# Patient Record
Sex: MALE | Race: WHITE | NOT HISPANIC OR LATINO | Employment: PART TIME | URBAN - METROPOLITAN AREA
[De-identification: names, ages, dates, MRNs, and addresses within clinical notes are randomized per-mention and may not be internally consistent; named-entity substitution may affect disease eponyms.]

---

## 2018-12-02 ENCOUNTER — OFFICE VISIT (OUTPATIENT)
Dept: URGENT CARE | Facility: CLINIC | Age: 18
End: 2018-12-02
Payer: COMMERCIAL

## 2018-12-02 VITALS
RESPIRATION RATE: 16 BRPM | WEIGHT: 191 LBS | HEART RATE: 60 BPM | HEIGHT: 71 IN | TEMPERATURE: 99.3 F | BODY MASS INDEX: 26.74 KG/M2 | OXYGEN SATURATION: 99 %

## 2018-12-02 DIAGNOSIS — S61.219A LACERATION WITHOUT FOREIGN BODY OF UNSPECIFIED FINGER WITHOUT DAMAGE TO NAIL, INITIAL ENCOUNTER: Primary | ICD-10-CM

## 2018-12-02 PROCEDURE — 12002 RPR S/N/AX/GEN/TRNK2.6-7.5CM: CPT | Performed by: PHYSICIAN ASSISTANT

## 2018-12-02 PROCEDURE — 90715 TDAP VACCINE 7 YRS/> IM: CPT

## 2018-12-02 PROCEDURE — G0382 LEV 3 HOSP TYPE B ED VISIT: HCPCS | Performed by: PHYSICIAN ASSISTANT

## 2018-12-02 RX ORDER — LIDOCAINE HYDROCHLORIDE 10 MG/ML
5 INJECTION, SOLUTION EPIDURAL; INFILTRATION; INTRACAUDAL; PERINEURAL ONCE
Status: COMPLETED | OUTPATIENT
Start: 2018-12-02 | End: 2018-12-02

## 2018-12-02 RX ORDER — GINSENG 100 MG
1 CAPSULE ORAL ONCE
Status: COMPLETED | OUTPATIENT
Start: 2018-12-02 | End: 2018-12-02

## 2018-12-02 RX ADMIN — Medication 1 SMALL APPLICATION: at 19:57

## 2018-12-02 RX ADMIN — LIDOCAINE HYDROCHLORIDE 5 ML: 10 INJECTION, SOLUTION EPIDURAL; INFILTRATION; INTRACAUDAL; PERINEURAL at 19:29

## 2018-12-03 NOTE — PATIENT INSTRUCTIONS
Apply antibiotic ointment daily change dressing twice a day  Cover when out in public  Keep area dry and clean  Discussed signs of infection with patient- Any signs of infection, follow-up  Follow-up in 7-10 days for suture removal sooner if anything changes

## 2018-12-03 NOTE — PROGRESS NOTES
NAME: Aleida Solomon is a 19 y o  male  : 2000    MRN: [id number]      Assessment and Plan   Laceration without foreign body of unspecified finger without damage to nail, initial encounter [S61 219A]  1  Laceration without foreign body of unspecified finger without damage to nail, initial encounter  lidocaine (PF) (XYLOCAINE-MPF) 1 % injection 5 mL    bacitracin topical ointment 1 small application    TDAP Vaccine greater than or equal to 8yo    Laceration repair     Administrations This Visit     bacitracin topical ointment 1 small application     Admin Date  2018 Action  Given Dose  1 small application Route  Topical Administered By  Jessica Martini RN          lidocaine (PF) (XYLOCAINE-MPF) 1 % injection 5 mL     Admin Date  2018 Action  Given Dose  5 mL Route  Infiltration Administered By  Jessica Martini RN                  Patient Instructions   Patient Instructions   Apply antibiotic ointment daily change dressing twice a day  Cover when out in public  Keep area dry and clean  Discussed signs of infection with patient- Any signs of infection, follow-up  Follow-up in 7-10 days for suture removal sooner if anything changes    Proceed to ER if symptoms worsen  History of Present Illness     Patient presents accompanied by girlfriend complaining of a laceration to the dorsal aspect of 2nd digit of his left hand  He reports he was reaching in a drawer and made contact with his  blades  He reports he immediately came here  Unsure when his last tetanus was would like to receive 1 today  Denies any numbness or tingling to the tip of the finger or any decreased range of motion  He reports the pain is not bad  Review of Systems   Review of Systems   Skin: Positive for wound  Current Medications     No current outpatient prescriptions on file  No current facility-administered medications for this visit       Current Allergies     Allergies as of 2018    (No Known Allergies)              No past medical history on file  No past surgical history on file  No family history on file  Medications have been verified  The following portions of the patient's history were reviewed and updated as appropriate: allergies, current medications, past family history, past medical history, past social history, past surgical history and problem list     Objective   Pulse 60   Temp 99 3 °F (37 4 °C)   Resp 16   Ht 5' 11" (1 803 m)   Wt 86 6 kg (191 lb)   SpO2 99%   BMI 26 64 kg/m²        Laceration repair  Date/Time: 12/2/2018 8:27 PM  Performed by: Angelo Murguia  Authorized by: Angelo Murguia   Consent: Verbal consent obtained  Consent given by: patient  Body area: upper extremity  Location details: left index finger  Laceration length: 3 cm  Foreign bodies: metal  Tendon involvement: none  Anesthesia: local infiltration    Anesthesia:  Local Anesthetic: lidocaine 1% without epinephrine    Wound Dehiscence:  Superficial Wound Dehiscence: simple closure      Procedure Details:  Irrigation solution: saline  Irrigation method: jet lavage  Skin closure: Ethilon  Number of sutures: 4  Dressing: antibiotic ointment and 4x4 sterile gauze  Patient tolerance: Patient tolerated the procedure well with no immediate complications        Physical Exam     Physical Exam   Constitutional: He appears well-developed and well-nourished  No distress  Skin:   3 cm laceration extending into the subcutaneous tissue on the dorsal aspect of the proximal phalanx of the 2nd left digit  No obvious tendon involvement as it is not visible  Bleeding is well controlled  Full flexion and extension of the finger including against resistance, strength is 5/5  Capillary refill distal to the laceration is less than 2 seconds  Sensation is intact

## 2018-12-12 ENCOUNTER — OFFICE VISIT (OUTPATIENT)
Dept: URGENT CARE | Facility: CLINIC | Age: 18
End: 2018-12-12
Payer: COMMERCIAL

## 2018-12-12 VITALS
OXYGEN SATURATION: 99 % | HEIGHT: 71 IN | DIASTOLIC BLOOD PRESSURE: 74 MMHG | BODY MASS INDEX: 26.18 KG/M2 | HEART RATE: 78 BPM | SYSTOLIC BLOOD PRESSURE: 131 MMHG | TEMPERATURE: 99.1 F | RESPIRATION RATE: 16 BRPM | WEIGHT: 187 LBS

## 2018-12-12 DIAGNOSIS — Z48.02 ENCOUNTER FOR REMOVAL OF SUTURES: Primary | ICD-10-CM

## 2018-12-12 PROCEDURE — G0382 LEV 3 HOSP TYPE B ED VISIT: HCPCS | Performed by: PHYSICIAN ASSISTANT

## 2018-12-12 NOTE — PROGRESS NOTES
NAME: Miguelina Dominguez is a 19 y o  male  : 2000    MRN: [id number]      Assessment and Plan   Encounter for removal of sutures [Z48 02]  1  Encounter for removal of sutures       removed 4 sutures from wound  Wound is well intact and edges are well approximated without any signs of infection  Patient Instructions   Patient Instructions   Sutures removed  Apply antibiotic ointment daily   Keep area clean   Any signs of infection such as fever, chills, redness or warmth- f/u here or with PCP       Proceed to ER if symptoms worsen  History of Present Illness     Patient presents for suture removal from his right 2nd digit  4 sutures were placed 10 days ago and patient states he has been keeping it covered since  He states he was keeping the area clean and applying abx ointment  He denies redness, warmth, swelling, pain or fevers/chills  He is noted to have a temp of 99 in clinic today and states that he has been having a cough and URI sx for the past 2 days  Patient denies any numbness or tingling to the tip of the finger or any decrease in AROM  Review of Systems   Review of Systems   Constitutional: Negative for chills and fever  Gastrointestinal: Negative for nausea and vomiting  Skin: Positive for wound  Current Medications     No current outpatient prescriptions on file  Current Allergies     Allergies as of 2018    (No Known Allergies)              No past medical history on file  No past surgical history on file  No family history on file  Medications have been verified      The following portions of the patient's history were reviewed and updated as appropriate: allergies, current medications, past family history, past medical history, past social history, past surgical history and problem list     Objective   /74   Pulse 78   Temp 99 1 °F (37 3 °C)   Resp 16   Ht 5' 11" (1 803 m)   Wt 84 8 kg (187 lb)   SpO2 99%   BMI 26 08 kg/m² Physical Exam     Physical Exam   Constitutional: He appears well-developed and well-nourished  No distress  Skin:   3 cm laceration which is well healing with scarring to the dorsal aspect of 2nd digit  4 sutures in place  No erythema, edema, warmth or discharge  Wound is well approximated without any separation of wound edges  NTTP  NSI  Sensation and blood flow intact distally  Capillary refill < 2 seconds